# Patient Record
Sex: MALE | Race: ASIAN | NOT HISPANIC OR LATINO
[De-identification: names, ages, dates, MRNs, and addresses within clinical notes are randomized per-mention and may not be internally consistent; named-entity substitution may affect disease eponyms.]

---

## 2019-02-25 ENCOUNTER — RECORD ABSTRACTING (OUTPATIENT)
Age: 84
End: 2019-02-25

## 2019-02-26 ENCOUNTER — APPOINTMENT (OUTPATIENT)
Dept: CARDIOLOGY | Facility: CLINIC | Age: 84
End: 2019-02-26
Payer: MEDICARE

## 2019-02-26 ENCOUNTER — NON-APPOINTMENT (OUTPATIENT)
Age: 84
End: 2019-02-26

## 2019-02-26 ENCOUNTER — RECORD ABSTRACTING (OUTPATIENT)
Age: 84
End: 2019-02-26

## 2019-02-26 VITALS
BODY MASS INDEX: 31.77 KG/M2 | HEIGHT: 65.5 IN | SYSTOLIC BLOOD PRESSURE: 130 MMHG | HEART RATE: 55 BPM | DIASTOLIC BLOOD PRESSURE: 60 MMHG | WEIGHT: 193 LBS

## 2019-02-26 DIAGNOSIS — Z82.49 FAMILY HISTORY OF ISCHEMIC HEART DISEASE AND OTHER DISEASES OF THE CIRCULATORY SYSTEM: ICD-10-CM

## 2019-02-26 DIAGNOSIS — I65.21 OCCLUSION AND STENOSIS OF RIGHT CAROTID ARTERY: ICD-10-CM

## 2019-02-26 DIAGNOSIS — K21.9 GASTRO-ESOPHAGEAL REFLUX DISEASE W/OUT ESOPHAGITIS: ICD-10-CM

## 2019-02-26 DIAGNOSIS — K64.9 UNSPECIFIED HEMORRHOIDS: ICD-10-CM

## 2019-02-26 DIAGNOSIS — Z78.9 OTHER SPECIFIED HEALTH STATUS: ICD-10-CM

## 2019-02-26 DIAGNOSIS — E66.9 OBESITY, UNSPECIFIED: ICD-10-CM

## 2019-02-26 DIAGNOSIS — Z80.41 FAMILY HISTORY OF MALIGNANT NEOPLASM OF OVARY: ICD-10-CM

## 2019-02-26 PROCEDURE — 99213 OFFICE O/P EST LOW 20 MIN: CPT

## 2019-02-26 PROCEDURE — 93000 ELECTROCARDIOGRAM COMPLETE: CPT

## 2019-02-26 RX ORDER — HYOSCYAMINE SULFATE 0.12 MG/1
TABLET ORAL
Refills: 0 | Status: DISCONTINUED | COMMUNITY
End: 2019-02-26

## 2019-02-26 RX ORDER — ASPIRIN 81 MG/1
81 TABLET ORAL
Refills: 0 | Status: DISCONTINUED | COMMUNITY
End: 2019-02-26

## 2019-02-26 RX ORDER — ACETAMINOPHEN 325 MG/1
325 TABLET, FILM COATED ORAL
Refills: 0 | Status: ACTIVE | COMMUNITY

## 2019-02-28 ENCOUNTER — APPOINTMENT (OUTPATIENT)
Dept: FAMILY MEDICINE | Facility: CLINIC | Age: 84
End: 2019-02-28
Payer: MEDICARE

## 2019-02-28 VITALS
SYSTOLIC BLOOD PRESSURE: 110 MMHG | WEIGHT: 196 LBS | BODY MASS INDEX: 32.65 KG/M2 | DIASTOLIC BLOOD PRESSURE: 60 MMHG | HEIGHT: 65 IN

## 2019-02-28 VITALS — RESPIRATION RATE: 12 BRPM | HEART RATE: 72 BPM | TEMPERATURE: 102.5 F | OXYGEN SATURATION: 95 %

## 2019-02-28 LAB
BILIRUB UR QL STRIP: NORMAL
CLARITY UR: CLEAR
COLLECTION METHOD: NORMAL
GLUCOSE UR-MCNC: NORMAL
HCG UR QL: NORMAL EU/DL
HGB UR QL STRIP.AUTO: NORMAL
KETONES UR-MCNC: NORMAL
LEUKOCYTE ESTERASE UR QL STRIP: NORMAL
NITRITE UR QL STRIP: NORMAL
PH UR STRIP: 5
PROT UR STRIP-MCNC: NORMAL
SP GR UR STRIP: 1.02

## 2019-02-28 PROCEDURE — 81003 URINALYSIS AUTO W/O SCOPE: CPT | Mod: QW

## 2019-02-28 PROCEDURE — 99214 OFFICE O/P EST MOD 30 MIN: CPT | Mod: 25

## 2019-02-28 PROCEDURE — 36415 COLL VENOUS BLD VENIPUNCTURE: CPT

## 2019-02-28 NOTE — PLAN
[FreeTextEntry1] : Fluids, Tylenol Q4 as needed.\par Call MD if any problems.\par MD will call patient in am to follow up

## 2019-02-28 NOTE — HISTORY OF PRESENT ILLNESS
[FreeTextEntry8] : "Since yesterday I am having frequent urine and high fevers"\par .\par No trouble breathing, no cough, no diarrhea, abdominal pain, or flank pain.\par No headache or dizziness.\par No dysuria.\par No hematuria noted.

## 2019-02-28 NOTE — PHYSICAL EXAM
[No Acute Distress] : no acute distress [PERRL] : pupils equal round and reactive to light [Normal Oropharynx] : the oropharynx was normal [Supple] : supple [No Lymphadenopathy] : no lymphadenopathy [Normal S1, S2] : normal S1 and S2 [Soft] : abdomen soft [Non Tender] : non-tender [No HSM] : no HSM [Normal Anterior Cervical Nodes] : no anterior cervical lymphadenopathy [No CVA Tenderness] : no CVA  tenderness [Normal Gait] : normal gait [No Focal Deficits] : no focal deficits [No Respiratory Distress] : no respiratory distress  [de-identified] : Has hiccups [de-identified] : Mild wet cough noted. Scattered bibasilar crackles

## 2019-02-28 NOTE — ASSESSMENT
[FreeTextEntry1] : Fevers with cough and increased urination. Normal UA\par VS normal but for elevated temp and O2 sat 95\par Will check CBC and viral nasal swab.

## 2019-02-28 NOTE — REVIEW OF SYSTEMS
[Fever] : fever [Chills] : chills [Incontinence] : incontinence [Frequency] : frequency [Fatigue] : no fatigue [Vision Problems] : no vision problems [Sore Throat] : no sore throat [Chest Pain] : no chest pain [Palpitations] : no palpitations [Shortness Of Breath] : no shortness of breath [Wheezing] : no wheezing [Cough] : no cough [Dyspnea on Exertion] : not dyspnea on exertion [Abdominal Pain] : no abdominal pain [Nausea] : no nausea [Constipation] : no constipation [Diarrhea] : no diarrhea [Vomiting] : no vomiting [Dysuria] : no dysuria [Hematuria] : no hematuria [Muscle Pain] : no muscle pain [Muscle Weakness] : no muscle weakness [Back Pain] : no back pain [Headache] : no headache [Dizziness] : no dizziness

## 2019-03-01 LAB
BASOPHILS # BLD AUTO: 0.03 K/UL
BASOPHILS NFR BLD AUTO: 0.4 %
EOSINOPHIL # BLD AUTO: 0 K/UL
EOSINOPHIL NFR BLD AUTO: 0 %
FLU A RESULT: NOT DETECTED
FLU B RESULT: DETECTED
HCT VFR BLD CALC: 42 %
HGB BLD-MCNC: 14 G/DL
IMM GRANULOCYTES NFR BLD AUTO: 0.1 %
LYMPHOCYTES # BLD AUTO: 1.15 K/UL
LYMPHOCYTES NFR BLD AUTO: 13.7 %
MAN DIFF?: NORMAL
MCHC RBC-ENTMCNC: 30.4 PG
MCHC RBC-ENTMCNC: 33.3 GM/DL
MCV RBC AUTO: 91.1 FL
MONOCYTES # BLD AUTO: 0.8 K/UL
MONOCYTES NFR BLD AUTO: 9.5 %
NEUTROPHILS # BLD AUTO: 6.43 K/UL
NEUTROPHILS NFR BLD AUTO: 76.3 %
PLATELET # BLD AUTO: 107 K/UL
RBC # BLD: 4.61 M/UL
RBC # FLD: 12.6 %
RSV RESULT: NOT DETECTED
WBC # FLD AUTO: 8.42 K/UL

## 2019-03-04 ENCOUNTER — RESULT REVIEW (OUTPATIENT)
Age: 84
End: 2019-03-04

## 2019-03-08 ENCOUNTER — TRANSCRIPTION ENCOUNTER (OUTPATIENT)
Age: 84
End: 2019-03-08

## 2019-05-31 ENCOUNTER — APPOINTMENT (OUTPATIENT)
Dept: FAMILY MEDICINE | Facility: CLINIC | Age: 84
End: 2019-05-31
Payer: MEDICARE

## 2019-05-31 VITALS — HEART RATE: 68 BPM

## 2019-05-31 VITALS
SYSTOLIC BLOOD PRESSURE: 140 MMHG | HEIGHT: 65 IN | WEIGHT: 195 LBS | DIASTOLIC BLOOD PRESSURE: 70 MMHG | BODY MASS INDEX: 32.49 KG/M2

## 2019-05-31 DIAGNOSIS — Z87.19 PERSONAL HISTORY OF OTHER DISEASES OF THE DIGESTIVE SYSTEM: ICD-10-CM

## 2019-05-31 DIAGNOSIS — Z87.898 PERSONAL HISTORY OF OTHER SPECIFIED CONDITIONS: ICD-10-CM

## 2019-05-31 DIAGNOSIS — R74.8 ABNORMAL LEVELS OF OTHER SERUM ENZYMES: ICD-10-CM

## 2019-05-31 LAB
BILIRUB UR QL STRIP: NORMAL
CLARITY UR: CLEAR
COLLECTION METHOD: NORMAL
GLUCOSE UR-MCNC: NORMAL
HCG UR QL: 0.2 EU/DL
HGB UR QL STRIP.AUTO: NORMAL
KETONES UR-MCNC: NORMAL
LEUKOCYTE ESTERASE UR QL STRIP: NORMAL
NITRITE UR QL STRIP: NORMAL
PH UR STRIP: 5.5
PROT UR STRIP-MCNC: NORMAL
SP GR UR STRIP: 1.03

## 2019-05-31 PROCEDURE — 81003 URINALYSIS AUTO W/O SCOPE: CPT | Mod: QW

## 2019-05-31 PROCEDURE — 99397 PER PM REEVAL EST PAT 65+ YR: CPT | Mod: 25

## 2019-05-31 PROCEDURE — 36415 COLL VENOUS BLD VENIPUNCTURE: CPT

## 2019-05-31 RX ORDER — AZITHROMYCIN 250 MG/1
250 TABLET, FILM COATED ORAL
Qty: 1 | Refills: 0 | Status: DISCONTINUED | COMMUNITY
Start: 2019-02-28 | End: 2019-05-31

## 2019-05-31 NOTE — HEALTH RISK ASSESSMENT
[Patient reported colonoscopy was normal] : Patient reported colonoscopy was normal [Very Good] : ~his/her~ current health as very good [Good] : ~his/her~  mood as  good [Two or more falls in past year] : Patient reported two or more falls in the past year [0] : 2) Feeling down, depressed, or hopeless: Not at all (0) [None] : None [With Significant Other] : lives with significant other [# of Members in Household ___] :  household currently consist of [unfilled] member(s) [Retired] : retired [Graduate School] : graduate school [] :  [# Of Children ___] : has [unfilled] children [Fully functional (bathing, dressing, toileting, transferring, walking, feeding)] : Fully functional (bathing, dressing, toileting, transferring, walking, feeding) [Fully functional (using the telephone, shopping, preparing meals, housekeeping, doing laundry, using] : Fully functional and needs no help or supervision to perform IADLs (using the telephone, shopping, preparing meals, housekeeping, doing laundry, using transportation, managing medications and managing finances) [Reports changes in vision] : Reports changes in vision [Reports normal functional visual acuity (ie: able to read med bottle)] : Reports normal functional visual acuity [Reports changes in dental health] : Reports changes in dental health [Smoke Detector] : smoke detector [Carbon Monoxide Detector] : carbon monoxide detector [Seat Belt] :  uses seat belt [Designated Healthcare Proxy] : Designated healthcare proxy [Name: ___] : Health Care Proxy's Name: [unfilled]  [Relationship: ___] : Relationship: [unfilled] [I will adhere to the patient's wishes as expressed in the advance directive except as noted below.] : I will adhere to the patient's wishes as expressed in the advance directive except as noted below [] : No [de-identified] : Dr Verma [de-identified] : Never [de-identified] : 1-2 weekly [de-identified] : Walk 1 mile daily [de-identified] : Regular [de-identified] : Not injured [[id number]] : 0 [Change in mental status noted] : No change in mental status noted [Language] : denies difficulty with language [Handling Complex Tasks] : denies difficulty handling complex tasks [Reports changes in hearing] : Reports no changes in hearing [Guns at Home] : no guns at home [Sunscreen] : does not use sunscreen [Travel to Developing Areas] : does not  travel to developing areas [TB Exposure] : is not being exposed to tuberculosis [ColonoscopyDate] : 01/14 [ColonoscopyComments] : Dr Edgar [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 Ophtho 2019 [de-identified] : Periodontist/dentist 2019 [AdvancecareDate] : 05/19

## 2019-05-31 NOTE — REVIEW OF SYSTEMS
[Nocturia] : nocturia [Negative] : Heme/Lymph [Hesitancy] : no hesitancy [Frequency] : no frequency [FreeTextEntry8] : 1-2x nightly

## 2019-05-31 NOTE — PHYSICAL EXAM

## 2019-06-11 LAB
25(OH)D3 SERPL-MCNC: 22.1 NG/ML
ALBUMIN SERPL ELPH-MCNC: 4.3 G/DL
ALP BLD-CCNC: 73 U/L
ALT SERPL-CCNC: 18 U/L
ANION GAP SERPL CALC-SCNC: 10 MMOL/L
AST SERPL-CCNC: 19 U/L
BASOPHILS # BLD AUTO: 0.04 K/UL
BASOPHILS NFR BLD AUTO: 0.6 %
BILIRUB SERPL-MCNC: 0.7 MG/DL
BUN SERPL-MCNC: 20 MG/DL
CALCIUM SERPL-MCNC: 9.4 MG/DL
CHLORIDE SERPL-SCNC: 104 MMOL/L
CHOLEST SERPL-MCNC: 114 MG/DL
CHOLEST/HDLC SERPL: 2.2 RATIO
CO2 SERPL-SCNC: 28 MMOL/L
CREAT SERPL-MCNC: 0.99 MG/DL
EOSINOPHIL # BLD AUTO: 0.06 K/UL
EOSINOPHIL NFR BLD AUTO: 1 %
ESTIMATED AVERAGE GLUCOSE: 126 MG/DL
GLUCOSE SERPL-MCNC: 136 MG/DL
HBA1C MFR BLD HPLC: 6 %
HCT VFR BLD CALC: 44.2 %
HDLC SERPL-MCNC: 51 MG/DL
HGB BLD-MCNC: 13.9 G/DL
IMM GRANULOCYTES NFR BLD AUTO: 0.2 %
LDLC SERPL CALC-MCNC: 54 MG/DL
LYMPHOCYTES # BLD AUTO: 1.69 K/UL
LYMPHOCYTES NFR BLD AUTO: 26.9 %
MAN DIFF?: NORMAL
MCHC RBC-ENTMCNC: 30.6 PG
MCHC RBC-ENTMCNC: 31.4 GM/DL
MCV RBC AUTO: 97.4 FL
MONOCYTES # BLD AUTO: 0.53 K/UL
MONOCYTES NFR BLD AUTO: 8.4 %
NEUTROPHILS # BLD AUTO: 3.96 K/UL
NEUTROPHILS NFR BLD AUTO: 62.9 %
PLATELET # BLD AUTO: 130 K/UL
POTASSIUM SERPL-SCNC: 4.4 MMOL/L
PROT SERPL-MCNC: 7.2 G/DL
RBC # BLD: 4.54 M/UL
RBC # FLD: 13.3 %
SODIUM SERPL-SCNC: 142 MMOL/L
TRIGL SERPL-MCNC: 45 MG/DL
WBC # FLD AUTO: 6.29 K/UL

## 2019-07-22 ENCOUNTER — APPOINTMENT (OUTPATIENT)
Dept: FAMILY MEDICINE | Facility: CLINIC | Age: 84
End: 2019-07-22
Payer: MEDICARE

## 2019-07-22 VITALS
WEIGHT: 195 LBS | SYSTOLIC BLOOD PRESSURE: 110 MMHG | DIASTOLIC BLOOD PRESSURE: 70 MMHG | HEIGHT: 65 IN | BODY MASS INDEX: 32.49 KG/M2

## 2019-07-22 PROCEDURE — 99213 OFFICE O/P EST LOW 20 MIN: CPT

## 2019-07-22 NOTE — HISTORY OF PRESENT ILLNESS
[de-identified] : Pt with irregular sleep due to care needs of spouse. Notes BP sometimes up to 150.\par No CP, No dizziness, no trouble breathing. [FreeTextEntry1] : ""My blood pressure is up and down"

## 2019-07-22 NOTE — PHYSICAL EXAM
[No Acute Distress] : no acute distress [Well-Appearing] : well-appearing [Coordination Grossly Intact] : coordination grossly intact [Clear to Auscultation] : lungs were clear to auscultation bilaterally [Normal S1, S2] : normal S1 and S2 [Normal Gait] : normal gait

## 2019-07-22 NOTE — REVIEW OF SYSTEMS
[Fatigue] : no fatigue [Chest Pain] : no chest pain [Fever] : no fever [Dizziness] : no dizziness [Palpitations] : no palpitations [Cough] : no cough

## 2019-07-27 ENCOUNTER — APPOINTMENT (OUTPATIENT)
Dept: FAMILY MEDICINE | Facility: CLINIC | Age: 84
End: 2019-07-27
Payer: MEDICARE

## 2019-07-27 VITALS
HEIGHT: 65 IN | BODY MASS INDEX: 32.49 KG/M2 | SYSTOLIC BLOOD PRESSURE: 130 MMHG | WEIGHT: 195 LBS | DIASTOLIC BLOOD PRESSURE: 70 MMHG

## 2019-07-27 VITALS — TEMPERATURE: 99 F

## 2019-07-27 PROCEDURE — 99213 OFFICE O/P EST LOW 20 MIN: CPT

## 2019-07-27 NOTE — REVIEW OF SYSTEMS
[Fever] : no fever [Hearing Loss] : no hearing loss [Earache] : no earache [Hoarseness] : no hoarseness [Sore Throat] : no sore throat [Chest Pain] : no chest pain [Cough] : no cough [FreeTextEntry4] : as per HPI

## 2019-07-27 NOTE — PLAN
[FreeTextEntry1] : Symptomatic care as per Dr Wellington regimen: alkaline mouthwash, hard sugarless candies, hydration.

## 2019-07-27 NOTE — HISTORY OF PRESENT ILLNESS
[FreeTextEntry8] : "I have parotid swelling on the left now"\par \par Pt had right parotid swelling on right in 2018. Now on left since yesterday.\par \par Pt had seen Dr Wellington last episode who advised against abx in favor of alkaline mouthwash and sialagogues.\par \par Not feverish

## 2019-07-27 NOTE — PHYSICAL EXAM
[Normal Oropharynx] : the oropharynx was normal [No Acute Distress] : no acute distress [de-identified] : No intraoral mass or swelling. No dry mucosa. [de-identified] : Mild swelling and tenderness left parotid area. No fluctuance

## 2019-10-08 ENCOUNTER — APPOINTMENT (OUTPATIENT)
Dept: FAMILY MEDICINE | Facility: CLINIC | Age: 84
End: 2019-10-08
Payer: MEDICARE

## 2019-10-08 VITALS
DIASTOLIC BLOOD PRESSURE: 70 MMHG | BODY MASS INDEX: 32.99 KG/M2 | HEIGHT: 65 IN | SYSTOLIC BLOOD PRESSURE: 120 MMHG | WEIGHT: 198 LBS

## 2019-10-08 VITALS — TEMPERATURE: 99 F

## 2019-10-08 DIAGNOSIS — J06.9 ACUTE UPPER RESPIRATORY INFECTION, UNSPECIFIED: ICD-10-CM

## 2019-10-08 PROCEDURE — 99213 OFFICE O/P EST LOW 20 MIN: CPT

## 2019-10-08 NOTE — PHYSICAL EXAM
[Normal Oropharynx] : the oropharynx was normal [Clear to Auscultation] : lungs were clear to auscultation bilaterally [Normal S1, S2] : normal S1 and S2 [No Focal Deficits] : no focal deficits [Alert and Oriented x3] : oriented to person, place, and time

## 2019-10-08 NOTE — REVIEW OF SYSTEMS
[Fever] : no fever [Fatigue] : no fatigue [Sore Throat] : no sore throat [Chest Pain] : no chest pain [Abdominal Pain] : no abdominal pain [Headache] : no headache [Dizziness] : no dizziness [FreeTextEntry6] : Resolved cough

## 2019-10-08 NOTE — HISTORY OF PRESENT ILLNESS
[FreeTextEntry8] : "We visit my sister in law in the nursing home often and I think we picked up a bug"\par \par Pt had flu vaccine at [facility] 9/28.\par \par Symptoms essentially resolved today.

## 2020-01-28 ENCOUNTER — APPOINTMENT (OUTPATIENT)
Dept: CARDIOLOGY | Facility: CLINIC | Age: 85
End: 2020-01-28

## 2020-03-12 ENCOUNTER — RX RENEWAL (OUTPATIENT)
Age: 85
End: 2020-03-12

## 2020-06-01 RX ORDER — MULTIVITAMIN
CAPSULE ORAL
Refills: 0 | Status: ACTIVE | COMMUNITY

## 2020-06-30 ENCOUNTER — APPOINTMENT (OUTPATIENT)
Dept: FAMILY MEDICINE | Facility: CLINIC | Age: 85
End: 2020-06-30
Payer: MEDICARE

## 2020-06-30 ENCOUNTER — APPOINTMENT (OUTPATIENT)
Dept: CARDIOLOGY | Facility: CLINIC | Age: 85
End: 2020-06-30
Payer: MEDICARE

## 2020-06-30 ENCOUNTER — NON-APPOINTMENT (OUTPATIENT)
Age: 85
End: 2020-06-30

## 2020-06-30 VITALS
HEART RATE: 55 BPM | SYSTOLIC BLOOD PRESSURE: 118 MMHG | DIASTOLIC BLOOD PRESSURE: 58 MMHG | WEIGHT: 182 LBS | HEIGHT: 65 IN | BODY MASS INDEX: 30.32 KG/M2

## 2020-06-30 VITALS
WEIGHT: 182 LBS | HEIGHT: 65 IN | SYSTOLIC BLOOD PRESSURE: 110 MMHG | HEART RATE: 51 BPM | OXYGEN SATURATION: 99 % | DIASTOLIC BLOOD PRESSURE: 70 MMHG | TEMPERATURE: 98 F | BODY MASS INDEX: 30.32 KG/M2

## 2020-06-30 DIAGNOSIS — I65.29 OCCLUSION AND STENOSIS OF UNSPECIFIED CAROTID ARTERY: ICD-10-CM

## 2020-06-30 DIAGNOSIS — I45.9 CONDUCTION DISORDER, UNSPECIFIED: ICD-10-CM

## 2020-06-30 DIAGNOSIS — I44.0 ATRIOVENTRICULAR BLOCK, FIRST DEGREE: ICD-10-CM

## 2020-06-30 PROCEDURE — 36415 COLL VENOUS BLD VENIPUNCTURE: CPT

## 2020-06-30 PROCEDURE — G0439: CPT

## 2020-06-30 PROCEDURE — 99214 OFFICE O/P EST MOD 30 MIN: CPT

## 2020-06-30 PROCEDURE — 93000 ELECTROCARDIOGRAM COMPLETE: CPT

## 2020-06-30 RX ORDER — AMLODIPINE BESYLATE 10 MG/1
10 TABLET ORAL DAILY
Qty: 7 | Refills: 0 | Status: DISCONTINUED | COMMUNITY
Start: 2020-06-04 | End: 2020-06-30

## 2020-06-30 RX ORDER — ATORVASTATIN CALCIUM 10 MG/1
10 TABLET, FILM COATED ORAL DAILY
Qty: 7 | Refills: 0 | Status: DISCONTINUED | COMMUNITY
Start: 2020-06-04 | End: 2020-06-30

## 2020-06-30 NOTE — HISTORY OF PRESENT ILLNESS
[de-identified] : 87 yr old with a history of HLD, HTN presenting for annual \par regular eye and dental exams \par primary care giver of wife with dementia\par notes managing well and has adequate coping strategies for stress and adequate assitance at home

## 2020-06-30 NOTE — HEALTH RISK ASSESSMENT
[Yes] : Yes [Monthly or less (1 pt)] : Monthly or less (1 point) [1 or 2 (0 pts)] : 1 or 2 (0 points) [Less than monthly (1 pt)] : Less than monthly (1 point) [No falls in past year] : Patient reported no falls in the past year [0] : 2) Feeling down, depressed, or hopeless: Not at all (0) [Patient declined colonoscopy] : Patient declined colonoscopy [With Significant Other] : lives with significant other [] :  [With Patient/Caregiver] : With Patient/Caregiver [I will adhere to the patient's wishes as expressed in the advance directive except as noted below.] : I will adhere to the patient's wishes as expressed in the advance directive except as noted below [] : No [de-identified] : Evelyn Stapleton [de-identified] : 1 mile of walking 3-4 x a week  [FreeTextEntry2] : SARAH  [de-identified] : lots of carbs  [AdvancecareDate] : 06/2020 [FreeTextEntry4] : Full code; not in favor of long term treatment

## 2020-06-30 NOTE — ASSESSMENT
[FreeTextEntry1] : UTD preventative \par discussed caregiver stress and resources available \par patient's wife is driving patient \par discussed importance of vigilance if any inability to drive is noted or any difficulty patient should consider new method of transportation- patient notes "she is sharp and laser focused"

## 2020-07-01 LAB
ALBUMIN SERPL ELPH-MCNC: 4.4 G/DL
ALP BLD-CCNC: 79 U/L
ALT SERPL-CCNC: 18 U/L
ANION GAP SERPL CALC-SCNC: 10 MMOL/L
AST SERPL-CCNC: 20 U/L
BASOPHILS # BLD AUTO: 0.04 K/UL
BASOPHILS NFR BLD AUTO: 0.7 %
BILIRUB SERPL-MCNC: 0.6 MG/DL
BUN SERPL-MCNC: 20 MG/DL
CALCIUM SERPL-MCNC: 9.3 MG/DL
CHLORIDE SERPL-SCNC: 102 MMOL/L
CHOLEST SERPL-MCNC: 106 MG/DL
CHOLEST/HDLC SERPL: 2 RATIO
CO2 SERPL-SCNC: 28 MMOL/L
CREAT SERPL-MCNC: 0.9 MG/DL
EOSINOPHIL # BLD AUTO: 0.07 K/UL
EOSINOPHIL NFR BLD AUTO: 1.2 %
ESTIMATED AVERAGE GLUCOSE: 123 MG/DL
GLUCOSE SERPL-MCNC: 90 MG/DL
HBA1C MFR BLD HPLC: 5.9 %
HCT VFR BLD CALC: 44.3 %
HDLC SERPL-MCNC: 55 MG/DL
HGB BLD-MCNC: 14.2 G/DL
IMM GRANULOCYTES NFR BLD AUTO: 0.2 %
LDLC SERPL CALC-MCNC: 43 MG/DL
LYMPHOCYTES # BLD AUTO: 2.33 K/UL
LYMPHOCYTES NFR BLD AUTO: 38.3 %
MAN DIFF?: NORMAL
MCHC RBC-ENTMCNC: 30.3 PG
MCHC RBC-ENTMCNC: 32.1 GM/DL
MCV RBC AUTO: 94.7 FL
MONOCYTES # BLD AUTO: 0.48 K/UL
MONOCYTES NFR BLD AUTO: 7.9 %
NEUTROPHILS # BLD AUTO: 3.15 K/UL
NEUTROPHILS NFR BLD AUTO: 51.7 %
PLATELET # BLD AUTO: 142 K/UL
POTASSIUM SERPL-SCNC: 4 MMOL/L
PROT SERPL-MCNC: 7.1 G/DL
RBC # BLD: 4.68 M/UL
RBC # FLD: 13 %
SODIUM SERPL-SCNC: 140 MMOL/L
TRIGL SERPL-MCNC: 43 MG/DL
WBC # FLD AUTO: 6.08 K/UL

## 2020-12-21 PROBLEM — J06.9 UPPER RESPIRATORY INFECTION, ACUTE: Status: RESOLVED | Noted: 2019-10-08 | Resolved: 2020-12-21

## 2020-12-28 ENCOUNTER — APPOINTMENT (OUTPATIENT)
Dept: FAMILY MEDICINE | Facility: CLINIC | Age: 85
End: 2020-12-28
Payer: MEDICARE

## 2020-12-28 VITALS
BODY MASS INDEX: 30.49 KG/M2 | OXYGEN SATURATION: 98 % | HEART RATE: 59 BPM | HEIGHT: 65 IN | WEIGHT: 183 LBS | DIASTOLIC BLOOD PRESSURE: 70 MMHG | TEMPERATURE: 98.2 F | SYSTOLIC BLOOD PRESSURE: 120 MMHG

## 2020-12-28 DIAGNOSIS — D69.6 THROMBOCYTOPENIA, UNSPECIFIED: ICD-10-CM

## 2020-12-28 PROCEDURE — 99213 OFFICE O/P EST LOW 20 MIN: CPT | Mod: 25

## 2020-12-28 PROCEDURE — 36415 COLL VENOUS BLD VENIPUNCTURE: CPT

## 2020-12-28 PROCEDURE — 99072 ADDL SUPL MATRL&STAF TM PHE: CPT

## 2020-12-28 NOTE — HISTORY OF PRESENT ILLNESS
[de-identified] : 87-year-old male with a history of hypertension hyperlipidemia prediabetes presents today for follow-up\par Notably patient had blood and a point-of-care urine dip\par No symptoms from the patient\par Patient has a history of prediabetes has been monitoring his diet\par Hypertension the patient notes that his blood pressure has been normal

## 2020-12-28 NOTE — HISTORY OF PRESENT ILLNESS
[de-identified] : 87-year-old male with a history of hypertension hyperlipidemia prediabetes presents today for follow-up\par Notably patient had blood and a point-of-care urine dip\par No symptoms from the patient\par Patient has a history of prediabetes has been monitoring his diet\par Hypertension the patient notes that his blood pressure has been normal

## 2020-12-29 LAB
APPEARANCE: CLEAR
BACTERIA: NEGATIVE
BASOPHILS # BLD AUTO: 0.04 K/UL
BASOPHILS NFR BLD AUTO: 0.7 %
BILIRUBIN URINE: NEGATIVE
BLOOD URINE: NORMAL
COLOR: YELLOW
EOSINOPHIL # BLD AUTO: 0.06 K/UL
EOSINOPHIL NFR BLD AUTO: 1 %
ESTIMATED AVERAGE GLUCOSE: 120 MG/DL
GLUCOSE QUALITATIVE U: NEGATIVE
HBA1C MFR BLD HPLC: 5.8 %
HCT VFR BLD CALC: 41.4 %
HGB BLD-MCNC: 13.8 G/DL
HYALINE CASTS: 3 /LPF
IMM GRANULOCYTES NFR BLD AUTO: 0 %
KETONES URINE: NEGATIVE
LEUKOCYTE ESTERASE URINE: NEGATIVE
LYMPHOCYTES # BLD AUTO: 2.14 K/UL
LYMPHOCYTES NFR BLD AUTO: 35.8 %
MAN DIFF?: NORMAL
MCHC RBC-ENTMCNC: 31.4 PG
MCHC RBC-ENTMCNC: 33.3 GM/DL
MCV RBC AUTO: 94.1 FL
MICROSCOPIC-UA: NORMAL
MONOCYTES # BLD AUTO: 0.48 K/UL
MONOCYTES NFR BLD AUTO: 8 %
NEUTROPHILS # BLD AUTO: 3.25 K/UL
NEUTROPHILS NFR BLD AUTO: 54.5 %
NITRITE URINE: NEGATIVE
PH URINE: 5.5
PLATELET # BLD AUTO: 144 K/UL
PROTEIN URINE: NORMAL
RBC # BLD: 4.4 M/UL
RBC # FLD: 12.7 %
RED BLOOD CELLS URINE: 3 /HPF
SPECIFIC GRAVITY URINE: 1.03
SQUAMOUS EPITHELIAL CELLS: 0 /HPF
UROBILINOGEN URINE: NORMAL
WBC # FLD AUTO: 5.97 K/UL
WHITE BLOOD CELLS URINE: 1 /HPF

## 2021-06-30 ENCOUNTER — APPOINTMENT (OUTPATIENT)
Age: 86
End: 2021-06-30

## 2021-08-26 ENCOUNTER — APPOINTMENT (OUTPATIENT)
Age: 86
End: 2021-08-26
Payer: MEDICARE

## 2021-08-26 VITALS
HEIGHT: 65 IN | DIASTOLIC BLOOD PRESSURE: 80 MMHG | OXYGEN SATURATION: 97 % | WEIGHT: 184 LBS | SYSTOLIC BLOOD PRESSURE: 120 MMHG | TEMPERATURE: 98 F | BODY MASS INDEX: 30.66 KG/M2 | HEART RATE: 58 BPM

## 2021-08-26 DIAGNOSIS — R60.0 LOCALIZED EDEMA: ICD-10-CM

## 2021-08-26 DIAGNOSIS — R31.29 OTHER MICROSCOPIC HEMATURIA: ICD-10-CM

## 2021-08-26 DIAGNOSIS — R73.02 IMPAIRED GLUCOSE TOLERANCE (ORAL): ICD-10-CM

## 2021-08-26 DIAGNOSIS — R35.1 NOCTURIA: ICD-10-CM

## 2021-08-26 PROCEDURE — 36415 COLL VENOUS BLD VENIPUNCTURE: CPT

## 2021-08-26 PROCEDURE — G0439: CPT

## 2021-08-26 RX ORDER — ZOSTER VACCINE RECOMBINANT, ADJUVANTED 50 MCG/0.5
50 KIT INTRAMUSCULAR
Qty: 2 | Refills: 0 | Status: ACTIVE | OUTPATIENT
Start: 2021-08-26

## 2021-08-26 NOTE — HISTORY OF PRESENT ILLNESS
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  No concerns at this time

## 2021-08-26 NOTE — HEALTH RISK ASSESSMENT
[] : No [No] : In the past 12 months have you used drugs other than those required for medical reasons? No [No falls in past year] : Patient reported no falls in the past year [de-identified] : Walking [de-identified] : Monitor his diet closely [Patient declined colonoscopy] : Patient declined colonoscopy [HIV test declined] : HIV test declined [Hepatitis C test declined] : Hepatitis C test declined [Change in mental status noted] : No change in mental status noted [Transportation] : transportation [With Significant Other] : lives with significant other [Employed] : employed [] :  [Sexually Active] : sexually active [Fully functional (bathing, dressing, toileting, transferring, walking, feeding)] : Fully functional (bathing, dressing, toileting, transferring, walking, feeding) [Fully functional (using the telephone, shopping, preparing meals, housekeeping, doing laundry, using] : Fully functional and needs no help or supervision to perform IADLs (using the telephone, shopping, preparing meals, housekeeping, doing laundry, using transportation, managing medications and managing finances) [Reports changes in hearing] : Reports no changes in hearing [Reports changes in vision] : Reports no changes in vision [Reports changes in dental health] : Reports no changes in dental health [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

## 2021-08-27 ENCOUNTER — NON-APPOINTMENT (OUTPATIENT)
Age: 86
End: 2021-08-27

## 2021-08-27 LAB
25(OH)D3 SERPL-MCNC: 19.2 NG/ML
ALBUMIN SERPL ELPH-MCNC: 4 G/DL
ALP BLD-CCNC: 88 U/L
ALT SERPL-CCNC: 18 U/L
ANION GAP SERPL CALC-SCNC: 10 MMOL/L
APPEARANCE: CLEAR
AST SERPL-CCNC: 19 U/L
BACTERIA: NEGATIVE
BILIRUB SERPL-MCNC: 0.6 MG/DL
BILIRUBIN URINE: NEGATIVE
BLOOD URINE: NEGATIVE
BUN SERPL-MCNC: 24 MG/DL
CALCIUM SERPL-MCNC: 9.3 MG/DL
CHLORIDE SERPL-SCNC: 106 MMOL/L
CHOLEST SERPL-MCNC: 102 MG/DL
CO2 SERPL-SCNC: 25 MMOL/L
COLOR: NORMAL
CREAT SERPL-MCNC: 1.12 MG/DL
ESTIMATED AVERAGE GLUCOSE: 126 MG/DL
GLUCOSE QUALITATIVE U: NEGATIVE
GLUCOSE SERPL-MCNC: 119 MG/DL
HBA1C MFR BLD HPLC: 6 %
HDLC SERPL-MCNC: 54 MG/DL
HYALINE CASTS: 0 /LPF
KETONES URINE: NEGATIVE
LDLC SERPL CALC-MCNC: 36 MG/DL
LEUKOCYTE ESTERASE URINE: NEGATIVE
MICROSCOPIC-UA: NORMAL
NITRITE URINE: NEGATIVE
NONHDLC SERPL-MCNC: 48 MG/DL
PH URINE: 6
POTASSIUM SERPL-SCNC: 4.5 MMOL/L
PROT SERPL-MCNC: 6.9 G/DL
PROTEIN URINE: NEGATIVE
RED BLOOD CELLS URINE: 1 /HPF
SODIUM SERPL-SCNC: 141 MMOL/L
SPECIFIC GRAVITY URINE: 1.02
SQUAMOUS EPITHELIAL CELLS: 0 /HPF
TRIGL SERPL-MCNC: 60 MG/DL
UROBILINOGEN URINE: NORMAL
WHITE BLOOD CELLS URINE: 0 /HPF

## 2021-10-17 ENCOUNTER — RX RENEWAL (OUTPATIENT)
Age: 86
End: 2021-10-17

## 2021-10-17 RX ORDER — ERGOCALCIFEROL 1.25 MG/1
1.25 MG CAPSULE, LIQUID FILLED ORAL
Qty: 8 | Refills: 0 | Status: ACTIVE | COMMUNITY
Start: 2021-08-27 | End: 1900-01-01

## 2021-10-27 ENCOUNTER — APPOINTMENT (OUTPATIENT)
Dept: FAMILY MEDICINE | Facility: CLINIC | Age: 86
End: 2021-10-27
Payer: MEDICARE

## 2021-10-27 DIAGNOSIS — Z00.00 ENCOUNTER FOR GENERAL ADULT MEDICAL EXAMINATION W/OUT ABNORMAL FINDINGS: ICD-10-CM

## 2021-10-27 DIAGNOSIS — Z23 ENCOUNTER FOR IMMUNIZATION: ICD-10-CM

## 2021-10-27 PROCEDURE — G0008: CPT

## 2021-10-27 PROCEDURE — 90662 IIV NO PRSV INCREASED AG IM: CPT

## 2022-02-15 ENCOUNTER — APPOINTMENT (OUTPATIENT)
Age: 87
End: 2022-02-15

## 2022-02-22 ENCOUNTER — APPOINTMENT (OUTPATIENT)
Age: 87
End: 2022-02-22

## 2022-08-26 ENCOUNTER — APPOINTMENT (OUTPATIENT)
Dept: FAMILY MEDICINE | Facility: CLINIC | Age: 87
End: 2022-08-26

## 2022-11-15 ENCOUNTER — APPOINTMENT (OUTPATIENT)
Dept: FAMILY MEDICINE | Facility: CLINIC | Age: 87
End: 2022-11-15

## 2022-11-15 PROCEDURE — G0008: CPT

## 2022-11-15 PROCEDURE — 90662 IIV NO PRSV INCREASED AG IM: CPT

## 2022-11-18 ENCOUNTER — MED ADMIN CHARGE (OUTPATIENT)
Age: 87
End: 2022-11-18

## 2023-01-09 ENCOUNTER — TRANSCRIPTION ENCOUNTER (OUTPATIENT)
Age: 88
End: 2023-01-09

## 2023-01-09 ENCOUNTER — NON-APPOINTMENT (OUTPATIENT)
Age: 88
End: 2023-01-09

## 2023-01-09 RX ORDER — AMLODIPINE AND ATORVASTATIN 10; 10 MG/1; MG/1
10-10 TABLET, COATED ORAL
Qty: 90 | Refills: 3 | Status: DISCONTINUED | COMMUNITY
Start: 2020-03-12 | End: 2023-01-09

## 2023-01-13 ENCOUNTER — APPOINTMENT (OUTPATIENT)
Dept: FAMILY MEDICINE | Facility: CLINIC | Age: 88
End: 2023-01-13
Payer: MEDICARE

## 2023-01-13 ENCOUNTER — NON-APPOINTMENT (OUTPATIENT)
Age: 88
End: 2023-01-13

## 2023-01-13 PROCEDURE — 99496 TRANSJ CARE MGMT HIGH F2F 7D: CPT | Mod: 95

## 2023-01-13 NOTE — HISTORY OF PRESENT ILLNESS
[Home] : at home, [unfilled] , at the time of the visit. [Medical Office: ([facility])___] : at the medical office located in  [Verbal consent obtained from patient] : the patient, [unfilled] [Post-hospitalization from ___ Hospital] : Post-hospitalization from [unfilled] Hospital [Admitted on: ___] : The patient was admitted on [unfilled] [Discharged on ___] : discharged on [unfilled] [Discharge Summary] : discharge summary [Pertinent Labs] : pertinent labs [Radiology Findings] : radiology findings [Discharge Med List] : discharge medication list [Med Reconciliation] : medication reconciliation has been completed [Patient Contacted By: ____] : and contacted by [unfilled] [FreeTextEntry2] : 89-year-old male with a history of hypertension presents today for post discharge exam through telehealth\par Patient experienced video difficulties and technical difficulties done over telephone\par Patient was admitted due to COVID-19\par He was given fluids monitored and recovered slowly however incidentally was noted to have dizziness and possible syncopal episode due to COVID-19.  Was given supplemental oxygen as needed albuterol as needed.  Patient slowly resolved however was noted to have 44% EF on echocardiogram\par Was evaluated by cardiology and advised to follow-up as he appeared to be compensated at that time

## 2023-01-13 NOTE — ASSESSMENT
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  Patient is able to get home delivery of food.  Patient will need work to get adequate transportation

## 2023-01-24 ENCOUNTER — NON-APPOINTMENT (OUTPATIENT)
Age: 88
End: 2023-01-24

## 2023-02-07 ENCOUNTER — NON-APPOINTMENT (OUTPATIENT)
Age: 88
End: 2023-02-07

## 2023-02-08 ENCOUNTER — APPOINTMENT (OUTPATIENT)
Dept: FAMILY MEDICINE | Facility: CLINIC | Age: 88
End: 2023-02-08
Payer: MEDICARE

## 2023-02-08 VITALS
DIASTOLIC BLOOD PRESSURE: 80 MMHG | SYSTOLIC BLOOD PRESSURE: 130 MMHG | BODY MASS INDEX: 30.82 KG/M2 | TEMPERATURE: 97.8 F | OXYGEN SATURATION: 98 % | HEIGHT: 65 IN | HEART RATE: 64 BPM | WEIGHT: 185 LBS

## 2023-02-08 PROCEDURE — 99214 OFFICE O/P EST MOD 30 MIN: CPT | Mod: 25

## 2023-02-08 PROCEDURE — 36415 COLL VENOUS BLD VENIPUNCTURE: CPT

## 2023-02-08 NOTE — PLAN
[FreeTextEntry1] : Hypertension\par -Patient was started on losartan in the hospital after his fall\par -At this time the patient is feeling better\par - his blood pressure has remained stable at home-\par -metabolic panel to be taken\par \par Cardiomyopathy -EF-44% as of the last echo-patient was unable to complete his last stress test according to the history\par \par Social-patient currently is having difficulties in making his appointments today he arrived 20 minutes late for his appointment.  He had his wife bring him today.  Discussed the best options for the patient and he would like to consider a geriatrics consult to get home visit\par It is unsafe for him to be driving with his wife given her lack of hearing and history.  Have discussed this with him.  He is aware and have discussed a solution.  At least for his medical visits he would benefit from a home visit physician\par \par

## 2023-02-08 NOTE — HISTORY OF PRESENT ILLNESS
[FreeTextEntry1] : follow up [de-identified] : 89 year of age M presents for follow up\par \par hypertension- patient started on losartan. BP normal at visit. Home BP averages usually 135 systolic. \par \par Patient reports no more recent falls.

## 2023-02-09 LAB
ANION GAP SERPL CALC-SCNC: 9 MMOL/L
BUN SERPL-MCNC: 28 MG/DL
CALCIUM SERPL-MCNC: 9.1 MG/DL
CHLORIDE SERPL-SCNC: 106 MMOL/L
CO2 SERPL-SCNC: 27 MMOL/L
CREAT SERPL-MCNC: 1.19 MG/DL
EGFR: 58 ML/MIN/1.73M2
GLUCOSE SERPL-MCNC: 134 MG/DL
POTASSIUM SERPL-SCNC: 4.4 MMOL/L
SODIUM SERPL-SCNC: 142 MMOL/L

## 2023-02-14 ENCOUNTER — NON-APPOINTMENT (OUTPATIENT)
Age: 88
End: 2023-02-14

## 2023-02-15 ENCOUNTER — NON-APPOINTMENT (OUTPATIENT)
Age: 88
End: 2023-02-15

## 2023-03-10 ENCOUNTER — NON-APPOINTMENT (OUTPATIENT)
Age: 88
End: 2023-03-10

## 2023-03-28 ENCOUNTER — NON-APPOINTMENT (OUTPATIENT)
Age: 88
End: 2023-03-28

## 2023-03-29 ENCOUNTER — RX RENEWAL (OUTPATIENT)
Age: 88
End: 2023-03-29

## 2023-05-23 ENCOUNTER — APPOINTMENT (OUTPATIENT)
Dept: GERIATRICS | Facility: CLINIC | Age: 88
End: 2023-05-23

## 2023-06-14 ENCOUNTER — APPOINTMENT (OUTPATIENT)
Dept: GERIATRICS | Facility: HOME HEALTH | Age: 88
End: 2023-06-14
Payer: MEDICARE

## 2023-06-14 VITALS
WEIGHT: 159.6 LBS | SYSTOLIC BLOOD PRESSURE: 130 MMHG | HEART RATE: 57 BPM | OXYGEN SATURATION: 100 % | TEMPERATURE: 96.9 F | DIASTOLIC BLOOD PRESSURE: 70 MMHG | BODY MASS INDEX: 26.56 KG/M2

## 2023-06-14 DIAGNOSIS — I44.7 LEFT BUNDLE-BRANCH BLOCK, UNSPECIFIED: ICD-10-CM

## 2023-06-14 PROCEDURE — 99344 HOME/RES VST NEW MOD MDM 60: CPT

## 2023-06-14 RX ORDER — ASPIRIN 81 MG/1
81 TABLET, COATED ORAL
Qty: 90 | Refills: 3 | Status: ACTIVE | COMMUNITY
Start: 2020-06-01 | End: 1900-01-01

## 2023-06-14 NOTE — ASSESSMENT
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

## 2023-06-14 NOTE — REVIEW OF SYSTEMS
[Recent Weight Loss (___ Lbs)] : recent [unfilled] ~Ulb weight loss [Eyesight Problems] : eyesight problems [Negative] : Heme/Lymph

## 2023-06-14 NOTE — PHYSICAL EXAM
[Alert] : alert [Well Nourished] : well nourished [Healthy Appearing] : healthy appearing [No Acute Distress] : in no acute distress [Well Developed] : well developed [Normal Voice/Communication] : normal voice/communication [Sclera] : the sclera and conjunctiva were normal [PERRL] : pupils were equal in size, round, and reactive to light [Normal Outer Ear/Nose] : the ears and nose were normal in appearance [No Respiratory Distress] : no respiratory distress [No Acc Muscle Use] : no accessory muscle use [Respiration, Rhythm And Depth] : normal respiratory rhythm and effort [Auscultation Breath Sounds / Voice Sounds] : lungs were clear to auscultation bilaterally [Normal S1, S2] : normal S1 and S2 [Murmurs] : no murmurs [Heart Rate And Rhythm] : heart rate was normal and rhythm regular [Heart Sounds Gallop] : no gallops [Abdomen Tenderness] : non-tender [No Masses] : no abdominal mass palpated [] : no hepato-splenomegaly [No Focal Deficits] : no focal deficits [Motor Exam] : the motor exam was normal [Normal] : normal affect and normal mood [Oriented To Time, Place, And Person] : oriented to person, place, and time [Normal Affect] : the affect was normal [Normal Mood] : the mood was normal [de-identified] : huge cerumen removed from left ear [de-identified] : trace LE edema [de-identified] : wide based gait

## 2023-06-14 NOTE — HISTORY OF PRESENT ILLNESS
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

## 2023-06-15 ENCOUNTER — LABORATORY RESULT (OUTPATIENT)
Age: 88
End: 2023-06-15

## 2023-06-20 RX ORDER — CHOLECALCIFEROL (VITAMIN D3) 25 MCG
25 MCG TABLET ORAL
Qty: 90 | Refills: 3 | Status: ACTIVE | COMMUNITY
Start: 2023-06-20 | End: 1900-01-01

## 2023-08-02 ENCOUNTER — APPOINTMENT (OUTPATIENT)
Dept: GERIATRICS | Facility: HOME HEALTH | Age: 88
End: 2023-08-02
Payer: MEDICARE

## 2023-08-02 VITALS
BODY MASS INDEX: 25.56 KG/M2 | SYSTOLIC BLOOD PRESSURE: 128 MMHG | TEMPERATURE: 95.5 F | DIASTOLIC BLOOD PRESSURE: 65 MMHG | HEART RATE: 68 BPM | OXYGEN SATURATION: 98 % | WEIGHT: 153.6 LBS

## 2023-08-02 DIAGNOSIS — H40.9 UNSPECIFIED GLAUCOMA: ICD-10-CM

## 2023-08-02 DIAGNOSIS — E55.9 VITAMIN D DEFICIENCY, UNSPECIFIED: ICD-10-CM

## 2023-08-02 DIAGNOSIS — E53.8 DEFICIENCY OF OTHER SPECIFIED B GROUP VITAMINS: ICD-10-CM

## 2023-08-02 DIAGNOSIS — I42.9 CARDIOMYOPATHY, UNSPECIFIED: ICD-10-CM

## 2023-08-02 DIAGNOSIS — N39.0 URINARY TRACT INFECTION, SITE NOT SPECIFIED: ICD-10-CM

## 2023-08-02 PROCEDURE — 99349 HOME/RES VST EST MOD MDM 40: CPT

## 2023-08-02 NOTE — HISTORY OF PRESENT ILLNESS
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

## 2023-08-02 NOTE — ASSESSMENT
[FreeTextEntry1] : trying to get help in the home weight loss - orders food meals on wheels check labs  o family to assist caregiver for 100 year olld wife  cognitvely intact   8/2/23 pt needs aide to help in the home multiple calls to office and from us resent script for cipro vit d vit b12 cont atorvastatin and losartan - controlled needs increase food intake ensure

## 2023-08-02 NOTE — PHYSICAL EXAM
[Alert] : alert [Well Nourished] : well nourished [Healthy Appearing] : healthy appearing [Well Developed] : well developed [Normal Voice/Communication] : normal voice/communication [Sclera] : the sclera and conjunctiva were normal [PERRL] : pupils were equal in size, round, and reactive to light [Normal Outer Ear/Nose] : the ears and nose were normal in appearance [No Respiratory Distress] : no respiratory distress [No Acc Muscle Use] : no accessory muscle use [Respiration, Rhythm And Depth] : normal respiratory rhythm and effort [Auscultation Breath Sounds / Voice Sounds] : lungs were clear to auscultation bilaterally [Normal S1, S2] : normal S1 and S2 [Murmurs] : no murmurs [Heart Rate And Rhythm] : heart rate was normal and rhythm regular [Heart Sounds Gallop] : no gallops [Abdomen Tenderness] : non-tender [No Masses] : no abdominal mass palpated [] : no hepato-splenomegaly [No Focal Deficits] : no focal deficits [Motor Exam] : the motor exam was normal [Normal] : normal affect and normal mood [Oriented To Time, Place, And Person] : oriented to person, place, and time [Normal Affect] : the affect was normal [Normal Mood] : the mood was normal [de-identified] : smells of urine [de-identified] : trace LE edema [de-identified] : wide based gait

## 2023-08-09 ENCOUNTER — NON-APPOINTMENT (OUTPATIENT)
Age: 88
End: 2023-08-09

## 2023-08-17 ENCOUNTER — NON-APPOINTMENT (OUTPATIENT)
Age: 88
End: 2023-08-17

## 2023-08-27 ENCOUNTER — NON-APPOINTMENT (OUTPATIENT)
Age: 88
End: 2023-08-27

## 2023-08-31 ENCOUNTER — APPOINTMENT (OUTPATIENT)
Dept: GERIATRICS | Facility: HOME HEALTH | Age: 88
End: 2023-08-31
Payer: MEDICARE

## 2023-08-31 VITALS
WEIGHT: 155.4 LBS | HEART RATE: 60 BPM | TEMPERATURE: 97.7 F | DIASTOLIC BLOOD PRESSURE: 80 MMHG | SYSTOLIC BLOOD PRESSURE: 160 MMHG | OXYGEN SATURATION: 100 % | BODY MASS INDEX: 25.86 KG/M2

## 2023-08-31 DIAGNOSIS — I10 ESSENTIAL (PRIMARY) HYPERTENSION: ICD-10-CM

## 2023-08-31 DIAGNOSIS — E78.5 HYPERLIPIDEMIA, UNSPECIFIED: ICD-10-CM

## 2023-08-31 DIAGNOSIS — R63.4 ABNORMAL WEIGHT LOSS: ICD-10-CM

## 2023-08-31 PROCEDURE — 99349 HOME/RES VST EST MOD MDM 40: CPT

## 2023-08-31 RX ORDER — LATANOPROST/PF 0.005 %
0.01 DROPS OPHTHALMIC (EYE)
Qty: 3 | Refills: 3 | Status: ACTIVE | COMMUNITY
Start: 1900-01-01 | End: 1900-01-01

## 2023-08-31 RX ORDER — LOSARTAN POTASSIUM 50 MG/1
50 TABLET, FILM COATED ORAL
Qty: 90 | Refills: 3 | Status: ACTIVE | COMMUNITY
Start: 2023-01-23 | End: 1900-01-01

## 2023-08-31 RX ORDER — ATORVASTATIN CALCIUM 10 MG/1
10 TABLET, FILM COATED ORAL
Qty: 90 | Refills: 3 | Status: ACTIVE | COMMUNITY
Start: 2023-01-12 | End: 1900-01-01

## 2023-08-31 RX ORDER — CIPROFLOXACIN HYDROCHLORIDE 250 MG/1
250 TABLET, FILM COATED ORAL
Qty: 14 | Refills: 0 | Status: DISCONTINUED | COMMUNITY
Start: 2023-08-02 | End: 2023-08-31

## 2023-08-31 NOTE — HISTORY OF PRESENT ILLNESS
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  he is SARAH educated worked at [employer]  he has hypertension high cholesterol  8/2/23 pt seen with wife they are eaitng meals on wheels - use one meal for lunch and dinner his vit d and vit D were low he is taking supplelemnts now does taek losartan and atorvstatin  pt did not take cipro that I called in  did not answer phone or check messages  8/31/23 pt was in ER after a fall - hurt  shoulder friday -5 days ago wife saying she is  afraid - that her  is hallucinating and not bathing, not eating walking normally his pants are urine stained still has gauze on arm from ER pt appears disheveled and is alert he is still wearing wearing paper hospital shirt  states boats are   gathering outside for  of defense -  he sees tanks outside that are not there gets meals on wheels states he did take cipro from [facility] does not have formal help Milly has been trying to help get aid as are people from the [facility]

## 2023-08-31 NOTE — ASSESSMENT
[FreeTextEntry1] : trying to get help in the home weight loss - orders food meals on wheels check labs  o family to assist caregiver for 100 year olld wife  cognitvely intact   8/2/23 pt needs aide to help in the home multiple calls to office and from us resent script for cipro vit d vit b12 cont atorvastatin and losartan - controlled needs increase food intake ensure  8/31/23 pt increased confusion  seeing boats out the window no danger to  self did not take losartan yet today knows all meds and states that he does normally take incl vit D, b12 needs incrased help besides  will renew all meds trying to get help for him and 89 year old wife

## 2023-08-31 NOTE — PHYSICAL EXAM
[Alert] : alert [Well Nourished] : well nourished [Healthy Appearing] : healthy appearing [Well Developed] : well developed [Normal Voice/Communication] : normal voice/communication [Sclera] : the sclera and conjunctiva were normal [PERRL] : pupils were equal in size, round, and reactive to light [Normal Outer Ear/Nose] : the ears and nose were normal in appearance [No Respiratory Distress] : no respiratory distress [No Acc Muscle Use] : no accessory muscle use [Respiration, Rhythm And Depth] : normal respiratory rhythm and effort [Auscultation Breath Sounds / Voice Sounds] : lungs were clear to auscultation bilaterally [Normal S1, S2] : normal S1 and S2 [Murmurs] : no murmurs [Heart Rate And Rhythm] : heart rate was normal and rhythm regular [Heart Sounds Gallop] : no gallops [Abdomen Tenderness] : non-tender [No Masses] : no abdominal mass palpated [] : no hepato-splenomegaly [No Focal Deficits] : no focal deficits [Motor Exam] : the motor exam was normal [Normal] : normal affect and normal mood [Oriented To Time, Place, And Person] : oriented to person, place, and time [Normal Affect] : the affect was normal [Normal Mood] : the mood was normal [de-identified] : smells of urine, dishveled - confused at times [de-identified] : trace LE edema [de-identified] : wide based gait, full ROM shoulders - no bruise seen

## 2023-09-06 ENCOUNTER — NON-APPOINTMENT (OUTPATIENT)
Age: 88
End: 2023-09-06

## 2023-09-07 ENCOUNTER — TRANSCRIPTION ENCOUNTER (OUTPATIENT)
Age: 88
End: 2023-09-07

## 2023-09-08 ENCOUNTER — NON-APPOINTMENT (OUTPATIENT)
Age: 88
End: 2023-09-08

## 2023-10-04 ENCOUNTER — NON-APPOINTMENT (OUTPATIENT)
Age: 88
End: 2023-10-04

## 2023-10-13 ENCOUNTER — NON-APPOINTMENT (OUTPATIENT)
Age: 88
End: 2023-10-13

## 2023-10-18 ENCOUNTER — TRANSCRIPTION ENCOUNTER (OUTPATIENT)
Age: 88
End: 2023-10-18

## 2023-10-19 ENCOUNTER — APPOINTMENT (OUTPATIENT)
Dept: GERIATRICS | Facility: HOME HEALTH | Age: 88
End: 2023-10-19